# Patient Record
Sex: MALE | Race: ASIAN | NOT HISPANIC OR LATINO | URBAN - METROPOLITAN AREA
[De-identification: names, ages, dates, MRNs, and addresses within clinical notes are randomized per-mention and may not be internally consistent; named-entity substitution may affect disease eponyms.]

---

## 2020-06-16 ENCOUNTER — EMERGENCY (EMERGENCY)
Facility: HOSPITAL | Age: 25
LOS: 1 days | Discharge: HOME | End: 2020-06-16
Attending: EMERGENCY MEDICINE
Payer: MEDICAID

## 2020-06-16 VITALS
HEIGHT: 69 IN | SYSTOLIC BLOOD PRESSURE: 131 MMHG | WEIGHT: 173.06 LBS | OXYGEN SATURATION: 100 % | TEMPERATURE: 98 F | HEART RATE: 59 BPM | DIASTOLIC BLOOD PRESSURE: 78 MMHG | RESPIRATION RATE: 17 BRPM

## 2020-06-16 VITALS
SYSTOLIC BLOOD PRESSURE: 118 MMHG | TEMPERATURE: 98 F | HEART RATE: 67 BPM | OXYGEN SATURATION: 100 % | RESPIRATION RATE: 17 BRPM | DIASTOLIC BLOOD PRESSURE: 78 MMHG

## 2020-06-16 PROCEDURE — 99283 EMERGENCY DEPT VISIT LOW MDM: CPT

## 2020-06-16 RX ORDER — OXYCODONE HYDROCHLORIDE 5 MG/1
5 TABLET ORAL ONCE
Refills: 0 | Status: DISCONTINUED | OUTPATIENT
Start: 2020-06-16 | End: 2020-06-16

## 2020-06-16 RX ORDER — IBUPROFEN 200 MG
600 TABLET ORAL ONCE
Refills: 0 | Status: COMPLETED | OUTPATIENT
Start: 2020-06-16 | End: 2020-06-16

## 2020-06-16 RX ADMIN — Medication 600 MILLIGRAM(S): at 22:06

## 2020-06-16 NOTE — ED PROVIDER NOTE - NSFOLLOWUPINSTRUCTIONS_ED_ALL_ED_FT
Please call the dental clinic in the morning to be seen on an emergency basis. Take tylenol and motrin for pain. You may take 1000 mg of tylenol every 6 hours and 600 mg of ibuprofen for pain. You may take these together. Return to the emergency department if you develop fever, swelling, uncontrollable pain.

## 2020-06-16 NOTE — ED PROVIDER NOTE - PROGRESS NOTE DETAILS
Discussed follow up with Dental resident who states patient can call dental clinic tomorrow and be seen as an emergency patient for pain control but clinic is not accepting new patients at this time. Patient states his insurance does not kick in until July 1st and he has an appointment on July 6th but he was hoping to be seen because he is in pain daily. Discussed extensively with patient - advised to call clinic and ask to be seen on an emergency basis for pain control. Patient does not want narcotic medication and says his pain is not so severe at the moment. Will give ibuprofen and dental clinic info. I advised patient that he can use ibuprofen and tylenol together for pain control. Return precautions discussed - fever, swelling, severe uncontrolled pain.

## 2020-06-16 NOTE — ED ADULT NURSE NOTE - OBJECTIVE STATEMENT
24y M aaox4 ambulatory from home presents to ED c/o toothache, a s per pt 1 month ago while eating a steak broke the Rtop tooth  tooth, unable to go see a Dentist , come to Ed for evaluation, pt reports pain w/ radiation to the R side of the face, jaw and a, unable to eat and sleep for the pain. Pt denies CP, SOB, HA, vision changes, n/v/d, fevers chills, abdominal pain. Safety and comfort measures initiated- bed placed in lowest position and side rails raised. Pt oriented to call bell system.

## 2020-06-16 NOTE — ED PROVIDER NOTE - PATIENT PORTAL LINK FT
You can access the FollowMyHealth Patient Portal offered by [facility] by registering at the following website: [email or web address]. By joining [facility]’s FollowMyHealth portal, you will also be able to view your health information using other applications (apps) compatible with our system.

## 2020-06-16 NOTE — ED PROVIDER NOTE - CLINICAL SUMMARY MEDICAL DECISION MAKING FREE TEXT BOX
chipped tooth x 1 month, no signs of infection. pain control in ED with motrin (declining oxycodone) and follow up with dental clinic on emergency basis

## 2020-06-16 NOTE — ED PROVIDER NOTE - ENMT, MLM
Airway patent, Nasal mucosa clear. Mouth with normal mucosa. Throat has no vesicles, no oropharyngeal exudates and uvula is midline. Tooth #1 chipped

## 2020-06-16 NOTE — ED PROVIDER NOTE - NSFOLLOWUPCLINICS_GEN_ALL_ED_FT
[facility]  Dental  [address] [place] [id number]  Phone: [phone]  Fax:   Follow Up Time:

## 2020-06-16 NOTE — ED PROVIDER NOTE - OBJECTIVE STATEMENT
Patient is a 23 yo M with no chronic medical problems here for toothache. He states he chipped his tooth, right upper molar about 1 month ago and has been having throbbing headaches as a result. Denies fever, chills, nausea, vomiting. He made an appointment with the dental clinic but then could not make the appointment last week. No facial swelling. He has been taking tylenol with some relief and applying orajel.

## 2021-08-08 ENCOUNTER — TRANSCRIPTION ENCOUNTER (OUTPATIENT)
Age: 26
End: 2021-08-08

## 2022-07-25 PROBLEM — Z00.00 ENCOUNTER FOR PREVENTIVE HEALTH EXAMINATION: Status: ACTIVE | Noted: 2022-07-25

## 2022-07-26 ENCOUNTER — APPOINTMENT (OUTPATIENT)
Dept: ORTHOPEDIC SURGERY | Facility: CLINIC | Age: 27
End: 2022-07-26

## 2022-07-26 VITALS — HEIGHT: 68 IN | BODY MASS INDEX: 28.04 KG/M2 | WEIGHT: 185 LBS

## 2022-07-26 DIAGNOSIS — M79.674 PAIN IN RIGHT TOE(S): ICD-10-CM

## 2022-07-26 PROCEDURE — 73562 X-RAY EXAM OF KNEE 3: CPT | Mod: RT

## 2022-07-26 PROCEDURE — 73630 X-RAY EXAM OF FOOT: CPT | Mod: RT

## 2022-07-26 PROCEDURE — 99204 OFFICE O/P NEW MOD 45 MIN: CPT

## 2022-07-26 NOTE — IMAGING
[Degenerative change] : Degenerative change [Right] : right foot [There are no fractures, subluxations or dislocations. No significant abnormalities are seen] : There are no fractures, subluxations or dislocations. No significant abnormalities are seen [de-identified] : Right knee:\par +ligamentous laxity\par No effusion\par Medial joint line and medial facet of patella TTP. +patellar crepitus\par ROM 0-135\par 5/5 strength throughout\par +Mcmurrays\par +lachman 2+ \par +pain with varus stress.  5-10mm opening to varus stress\par equivocal dial test\par 4+ quadrant mobility of patella with pain and apprehension\par \par Right Foot:\par No swelling\par TTP over 1st MTP\par Full ROM\par 5/5 strength throughout

## 2022-07-26 NOTE — HISTORY OF PRESENT ILLNESS
[6] : 6 [1] : 2 [Dull/Aching] : dull/aching [de-identified] : 7/26/22: Patient is a 25 yo male c/o right big toe and right knee pain for 3 months with no specific injury. Hx of right knee injury 12 years ago, sprain, improved with PT. No n/t. States his knee feels "unstable." Not taking any medication for pain. Knee pain is worse with walking and activity. Toe pain is worse after getting up from sitting for long period time. No previous surgeries to right knee or right foot.\par Occupation: Restaurant work \par \par  [FreeTextEntry5] : pain started 5/2022, worse up and down stairs associated with cracking, clicking, weakness, buckling pain located medial, lateral

## 2022-07-26 NOTE — DISCUSSION/SUMMARY
[de-identified] : discussed likely multilgamentous knee injury. recommend MRI right knee eval acl, lcl, pcl, menisci. dex curran mri\par \par ------------------------------------------------------------------------------------------------------------------------------------------------------\par \par \par The patient was advised of the diagnosis.  The natural history of the pathology was explained in full. All questions were answered.  The risks and benefits of conservative and interventional treatment alternatives were explained to the patient\par \par ------------------------------------------------------------------------------------------------------------------------------------------------------\par \par The patient was advised that an advanced imaging study (MRI/CT/etc) will be ordered to evaluate potential pathology in the affected area(s).  The patient was further advised to follow up in the office to review the results of the study and determine further management that may be indicated.\par \par ------------------------------------------------------------------------------------------------------------------------------------------------------\par \par

## 2022-07-31 ENCOUNTER — FORM ENCOUNTER (OUTPATIENT)
Age: 27
End: 2022-07-31

## 2022-08-01 ENCOUNTER — APPOINTMENT (OUTPATIENT)
Dept: MRI IMAGING | Facility: CLINIC | Age: 27
End: 2022-08-01

## 2022-08-01 PROCEDURE — 73721 MRI JNT OF LWR EXTRE W/O DYE: CPT | Mod: RT

## 2022-08-15 ENCOUNTER — APPOINTMENT (OUTPATIENT)
Dept: ORTHOPEDIC SURGERY | Facility: CLINIC | Age: 27
End: 2022-08-15

## 2022-08-15 VITALS — BODY MASS INDEX: 28.04 KG/M2 | WEIGHT: 185 LBS | HEIGHT: 68 IN

## 2022-08-15 DIAGNOSIS — M23.91 UNSPECIFIED INTERNAL DERANGEMENT OF RIGHT KNEE: ICD-10-CM

## 2022-08-15 DIAGNOSIS — S89.81XA OTHER SPECIFIED INJURIES OF RIGHT LOWER LEG, INITIAL ENCOUNTER: ICD-10-CM

## 2022-08-15 DIAGNOSIS — M23.306 OTHER MENISCUS DERANGEMENTS, UNSPECIFIED MENISCUS, RIGHT KNEE: ICD-10-CM

## 2022-08-15 PROCEDURE — 99215 OFFICE O/P EST HI 40 MIN: CPT

## 2022-08-15 NOTE — DATA REVIEWED
[MRI] : MRI [Right] : of the right [Knee] : knee [I independently reviewed and interpreted images and report] : I independently reviewed and interpreted images and report [FreeTextEntry1] : complete acl tear. effusion. lfc cartilage injury posterior wb surface. mfc cartilage injury wb. buckethandle medial meniscus tear. lateral meniscus tear

## 2022-08-15 NOTE — IMAGING
[Degenerative change] : Degenerative change [Right] : right foot [There are no fractures, subluxations or dislocations. No significant abnormalities are seen] : There are no fractures, subluxations or dislocations. No significant abnormalities are seen [de-identified] : Right knee:\par +ligamentous laxity\par No effusion\par Medial joint line and medial facet of patella TTP. +patellar crepitus\par ROM 0-135\par 5/5 strength throughout\par +Mcmurrays\par +lachman 2+ \par +pain with varus stress.  5-10mm opening to varus stress\par equivocal dial test\par 4+ quadrant mobility of patella with pain and apprehension\par \par Right Foot:\par No swelling\par TTP over 1st MTP\par Full ROM\par 5/5 strength throughout

## 2022-08-15 NOTE — HISTORY OF PRESENT ILLNESS
[6] : 6 [1] : 2 [Dull/Aching] : dull/aching [de-identified] : here today to review mri\par \par \par 7/26/22: Patient is a 25 yo male c/o right big toe and right knee pain for 3 months with no specific injury. Hx of right knee injury 12 years ago, sprain, improved with PT. No n/t. States his knee feels "unstable." Not taking any medication for pain. Knee pain is worse with walking and activity. Toe pain is worse after getting up from sitting for long period time. No previous surgeries to right knee or right foot.\par \par Occupation: Restaurant work \par \par  [FreeTextEntry5] : pain started 5/2022, worse up and down stairs associated with cracking, clicking, weakness, buckling pain located medial, lateral

## 2022-08-15 NOTE — DISCUSSION/SUMMARY
[de-identified] : discussed mri findings. rx functional acl brace.  also reviewed old mri from 2010 at [facility]- complete ACL tear, buckethandle medial meniscus tear. \par \par discussed given chronic buckethandle medial mensicus tear, complete acl tear, signfiicant cartilage injuries- would recommend eval for ACL reconstruction, meniscus allograft transplantation, possible oats. consider diagnostic arthroscopy first. will need alignment films. discussed RBA/rehab/recovery related to multiple surgical options. refer to tertiary care\par \par ------------------------------------------------------------------------------------------------------------------------------------------------------\par \par The patient was advised of the diagnosis.  The natural history of the pathology was explained in full. All questions were answered.  The risks and benefits of conservative and interventional treatment alternatives were explained to the patient\par \par ------------------------------------------------------------------------------------------------------------------------------------------------------\par \par The patient was advised that an advanced imaging study (MRI/CT/etc) will be ordered to evaluate potential pathology in the affected area(s).  The patient was further advised to follow up in the office to review the results of the study and determine further management that may be indicated.\par \par ------------------------------------------------------------------------------------------------------------------------------------------------------\par \par

## 2022-08-30 ENCOUNTER — APPOINTMENT (OUTPATIENT)
Dept: ORTHOPEDIC SURGERY | Facility: CLINIC | Age: 27
End: 2022-08-30

## 2022-08-30 VITALS — BODY MASS INDEX: 27.89 KG/M2 | HEIGHT: 68 IN | WEIGHT: 184 LBS

## 2022-08-30 PROCEDURE — 99203 OFFICE O/P NEW LOW 30 MIN: CPT

## 2022-09-06 NOTE — ADDENDUM
[FreeTextEntry1] : This note was written by Cassie Mai on 08/30/2022 acting solely as a scribe for Dr. Moshe Chicas.\par \par All medical record entries made by the Scribe were at my, Dr. Moshe Chicas, direction and personally dictated by me on 08/30/2022. I have personally reviewed the chart and agree that the record accurately reflects my personal performance of the history, physical exam, assessment and plan.

## 2022-09-06 NOTE — HISTORY OF PRESENT ILLNESS
[de-identified] : 27 year old male presents today with right knee pain x 3 months. He reports a prior injury to the right knee approximately 12 years prior.  Symptoms improved at that time with some PT. He noticed recent pain after playing volleyball. He obtained x-ray and MRI through PMD. He was evaluated at [facility] with ACL and meniscus tear. He was told that he needs recommend ACL reconstruction, meniscus allograft transplantation, possible oats and was referred here for surgical consultation. The pain is intermittent and is not taking pain medication. C/O instability. \par \par The patient's past medical history, past surgical history, medications and allergies were reviewed by me today with the patient and documented accordingly. In addition, the patient's family and social history, which were noncontributory to this visit, were reviewed also.

## 2022-09-06 NOTE — DISCUSSION/SUMMARY
[de-identified] : 28 y/o male with right knee ACL tear, bucket MMT, chondral defect lateral femoral condyle\par \par Patient presents for evaluation of acute on chronic right knee pain. We discussed that he likely tore his ACL during his initial injury 12 years prior, and has been managing without an ACL since that time.  Patient currently has positive gross instability testing as well as now pathology through the medial compartment of the knee.  MRI from his initial injury showed ACL injury as well as a tear of the medial meniscus.  The medial meniscus is now displaced within the intercondylar notch consistent with a bucket-handle tear.\par \par We discussed the complexity of the patient's case at this time.  Discussion was had regarding possible surgical intervention for ACL reconstruction and meniscus surgery.  Concerned that the medial meniscus will be nonsalvageable, which may require him to ultimately need meniscus allograft transplantation.  We also discussed his varus alignment as seen on x-ray imaging today and potential need for additional osteotomy to protect the medial compartment for further breakdown as there does appear to be slight early arthrosis on x-ray imaging.  We discussed that surgical intervention may be best performed in a staged fashion to better evaluate the chondral surfaces and to determine whether the meniscus is salvageable.\par \par Consideration at this time would be for diagnostic arthroscopy of the knee with partial meniscectomy and subsequent ACL/meniscus/HTO, versus ACL reconstruction if the meniscus is salvageable medially.\par \par All risks, benefits and alternatives to the proposed surgical procedure, as well as the need for formal post-operative rehabilitation were discussed in great detail with the patient. Risks include but are not limited to pain, bleeding, infection, neurovascular injury, stiffness, revision surgery, future surgery, failure, instability, medical complications (including DVT, PE, MI), and risks of anesthesia. \par \par A discussion was also had with the patient regarding additional precautions during surgery given the recent Covid-19 pandemic; specifically with regards to perioperative care, visitor policy, and pre-admission testing. The patient understands that current protocol requires either documented Covid-19 vaccination or a negative Covid-19 test no more than 48hrs prior to surgery. \par \par Patient questions and concerns were answered.  We will follow-up with the patient next week to consider treatment alternatives.\par

## 2022-09-06 NOTE — PHYSICAL EXAM
[de-identified] : Oriented to time, place, person\par Mood: Normal\par Affect: Normal\par Appearance: Healthy, well appearing, no acute distress.\par Gait: Normal\par Assistive Devices: None\par \par Right Knee Exam:\par \par Skin: Clean, dry, intact\par Inspection: No obvious malalignment, no masses, no swelling, no effusion\par Pulses: 2+ DP/PT pulses \par ROM: 0-135 degrees of flexion. No pain with deep knee flexion/extension.\par Tenderness: +MJLT. No LJLT. No pain over the patella facets. No pain to the quadriceps tendon. No pain to the patella tendon. No posterior knee tenderness.\par Stability: Stable to varus, valgus. IIB Lachman testing. + anterior drawer, negative posterior drawer.\par Strength: 5/5 Q/H/TA/GS/EHL, without atrophy\par Neuro: Intact to light touch throughout, DTRs normal\par Additional Tests: +Danii's test, Negative patellar grind test  [de-identified] : Images were reviewed dated 7.25.2022 \par \par 4 views of the right knee that show no acute fracture or dislocation. There is mild medial, no lateral and trace patellofemoral degenerative changes seen. There is no significant malalignment. No significant other obvious osseous abnormality, otherwise unremarkable.\par \par MRI right knee dated 8.1.22 shows chronic ACL tear. Bucket handle tear of the medial meniscus. Lateral meniscus tear/chondral defect. Patella jackson.

## 2022-09-12 ENCOUNTER — OUTPATIENT (OUTPATIENT)
Dept: OUTPATIENT SERVICES | Facility: HOSPITAL | Age: 27
LOS: 1 days | End: 2022-09-12

## 2022-09-12 VITALS
TEMPERATURE: 98 F | OXYGEN SATURATION: 99 % | SYSTOLIC BLOOD PRESSURE: 114 MMHG | RESPIRATION RATE: 16 BRPM | HEIGHT: 68 IN | DIASTOLIC BLOOD PRESSURE: 78 MMHG | HEART RATE: 62 BPM | WEIGHT: 197.98 LBS

## 2022-09-12 DIAGNOSIS — S83.511A SPRAIN OF ANTERIOR CRUCIATE LIGAMENT OF RIGHT KNEE, INITIAL ENCOUNTER: ICD-10-CM

## 2022-09-12 DIAGNOSIS — S83.241A OTHER TEAR OF MEDIAL MENISCUS, CURRENT INJURY, RIGHT KNEE, INITIAL ENCOUNTER: ICD-10-CM

## 2022-09-12 LAB
HCT VFR BLD CALC: 46.3 % — SIGNIFICANT CHANGE UP (ref 39–50)
HGB BLD-MCNC: 15 G/DL — SIGNIFICANT CHANGE UP (ref 13–17)
MCHC RBC-ENTMCNC: 26.9 PG — LOW (ref 27–34)
MCHC RBC-ENTMCNC: 32.4 GM/DL — SIGNIFICANT CHANGE UP (ref 32–36)
MCV RBC AUTO: 83 FL — SIGNIFICANT CHANGE UP (ref 80–100)
NRBC # BLD: 0 /100 WBCS — SIGNIFICANT CHANGE UP (ref 0–0)
NRBC # FLD: 0 K/UL — SIGNIFICANT CHANGE UP (ref 0–0)
PLATELET # BLD AUTO: 183 K/UL — SIGNIFICANT CHANGE UP (ref 150–400)
RBC # BLD: 5.58 M/UL — SIGNIFICANT CHANGE UP (ref 4.2–5.8)
RBC # FLD: 12.4 % — SIGNIFICANT CHANGE UP (ref 10.3–14.5)
WBC # BLD: 6.07 K/UL — SIGNIFICANT CHANGE UP (ref 3.8–10.5)
WBC # FLD AUTO: 6.07 K/UL — SIGNIFICANT CHANGE UP (ref 3.8–10.5)

## 2022-09-12 NOTE — H&P PST ADULT - MUSCULOSKELETAL COMMENTS
Pt c/o of right knee pain since last few months. Pt says he had a sports injury 12 years ago , symptoms improved and lately reports pain again after  playing volleyball recently . MRI showed  right knee dated 8.1.22 shows chronic ACL tear. Bucket handle tear of the medial meniscus. Lateral meniscus tear/chondral defect. Patella jackson. Pre op dx of sprain anterior cruciate ligament right knee initial encounter

## 2022-09-12 NOTE — H&P PST ADULT - PROBLEM SELECTOR PLAN 1
Patient tentatively scheduled for right arthroscopic medial meniscus repair anterior cruciate ligament reconstruction bone tendon bone autograft on 09/27/2022.    Pre-op instructions provided. Pt given verbal and written instructions with teach back on chlorhexidine wash and pepcid. Pt verbalized understanding with return demonstration.     Pt strongly advised  for  COVID testing requirements to be done  3- 5 days prior to procedure. Pt was provided with information for covid testing locations in written form.

## 2022-09-12 NOTE — H&P PST ADULT - HISTORY OF PRESENT ILLNESS
27 year old male with pre op dx of sprain anterior cruciate ligament right knee initial encounter is scheduled for right arthroscopic medial meniscus repair anterior cruciate ligament reconstruction bone tendon bone autograft.

## 2022-09-26 ENCOUNTER — TRANSCRIPTION ENCOUNTER (OUTPATIENT)
Age: 27
End: 2022-09-26

## 2022-09-26 VITALS
SYSTOLIC BLOOD PRESSURE: 121 MMHG | WEIGHT: 197.98 LBS | TEMPERATURE: 98 F | RESPIRATION RATE: 16 BRPM | HEIGHT: 68 IN | HEART RATE: 67 BPM | OXYGEN SATURATION: 100 % | DIASTOLIC BLOOD PRESSURE: 77 MMHG

## 2022-09-27 ENCOUNTER — TRANSCRIPTION ENCOUNTER (OUTPATIENT)
Age: 27
End: 2022-09-27

## 2022-09-27 ENCOUNTER — APPOINTMENT (OUTPATIENT)
Dept: ORTHOPEDIC SURGERY | Facility: AMBULATORY SURGERY CENTER | Age: 27
End: 2022-09-27

## 2022-09-27 ENCOUNTER — OUTPATIENT (OUTPATIENT)
Dept: OUTPATIENT SERVICES | Facility: HOSPITAL | Age: 27
LOS: 1 days | Discharge: HOME | End: 2022-09-27

## 2022-09-27 VITALS
SYSTOLIC BLOOD PRESSURE: 140 MMHG | OXYGEN SATURATION: 98 % | DIASTOLIC BLOOD PRESSURE: 74 MMHG | HEART RATE: 87 BPM | TEMPERATURE: 97 F | RESPIRATION RATE: 14 BRPM

## 2022-09-27 DIAGNOSIS — S83.241A OTHER TEAR OF MEDIAL MENISCUS, CURRENT INJURY, RIGHT KNEE, INITIAL ENCOUNTER: ICD-10-CM

## 2022-09-27 PROCEDURE — 29883 ARTHRS KNE SRG MNISC RPR M&L: CPT | Mod: RT

## 2022-09-27 PROCEDURE — 29888 ARTHRS AID ACL RPR/AGMNTJ: CPT | Mod: RT

## 2022-09-27 DEVICE — PK ACCESS CRUC RAP-PAC 1.5 STRL: Type: IMPLANTABLE DEVICE | Status: FUNCTIONAL

## 2022-09-27 DEVICE — FLEXIBLE PASSING PIN 13.50X2.4MM: Type: IMPLANTABLE DEVICE | Status: FUNCTIONAL

## 2022-09-27 DEVICE — IMPLANTABLE DEVICE: Type: IMPLANTABLE DEVICE | Status: FUNCTIONAL

## 2022-09-27 DEVICE — S&N FASTFIX 360 CURVED: Type: IMPLANTABLE DEVICE | Status: FUNCTIONAL

## 2022-09-27 DEVICE — ARTHREX SECONDARY FIXATION WITH PEEK SWIVELOCK ANCHOR 4.75 X 19.1MM: Type: IMPLANTABLE DEVICE | Status: FUNCTIONAL

## 2022-09-27 RX ORDER — FAMOTIDINE 10 MG/ML
0 INJECTION INTRAVENOUS
Qty: 0 | Refills: 0 | DISCHARGE

## 2022-09-27 RX ORDER — ASPIRIN 325 MG/1
325 TABLET, FILM COATED ORAL DAILY
Qty: 28 | Refills: 0 | Status: ACTIVE | COMMUNITY
Start: 2022-09-27 | End: 1900-01-01

## 2022-09-27 NOTE — ASU DISCHARGE PLAN (ADULT/PEDIATRIC) - CARE PROVIDER_API CALL
Moshe Chicas)  Orthopedics  [address]  [place] [address]  Phone: [phone]  Fax: [phone]  Follow Up Time: 2 weeks

## 2022-09-27 NOTE — ASU PREOP CHECKLIST - NS PREOP CHK TEST_COVID RESULT_GEN_ALL_CORE
CHF Week 1 Survey      Responses   Facility patient discharged from?  [facility]   Does the patient have one of the following disease processes/diagnoses(primary or secondary)?  CHF   Is there a successful TCM telephone encounter documented?  No   CHF Week 1 attempt successful?  No   Rescheduled  Revoked          Alessandra Allen RN   Negative

## 2022-09-27 NOTE — ASU PREOP CHECKLIST - TEMPERATURE IN FAHRENHEIT (DEGREES F)
DISCHARGE INSTRUCTIONS  [facility]  [address] [place] [address]   [phone]      Name: Luiz Beasley    Date of Treatment: 3/23/17    Doctor: Dr. Viramontes    Your nurse is: Azeb PENALOZAN , RN    Chemotherapy drugs administered today:  Taxotere, Carboplatin, Herceptin, Perjeta    Home Medications:  MEDICATION: INSTRUCTIONS:        Dexamethasone 4mg tablets Take as directed - FOR NEXT TREATMENT   2 tabs am  2 tabs pm on   04/12/17   2 tabs am  2 tabs pm on   4/13/17 2 tabs am  2 tabs pm   on   4/14/17   Prochlorperazine  10 mg Take 1 tab every 6 hours if needed for nausea.        Emla cream 2.5%  apply to port 30-60 minutes prior to treatment.            Neulasta( kit )  6 mg       Remove kit on 3/24/17 around  6 pm        Claritin 10 mg      take once a day for 3 days starting tomorrow 3/24/17        Lorazepam 0.5mg      Take one tablet at bedtime as needed to help sleep.              1. Increase fluid intake to 6-8 (8oz) glasses of fluids daily.    2. Use Biotene mouthwash daily to help prevent mouth sores.    3. Refer to \"When to call\" sheet!    4. Good hand washing.    5.  Use Senokot-S or Miralax for constipation.  Use Imodium-AD for diarrhea.     6  Return next Tuesday for a blood test and hydration check       Call our 24 hr number with any problems.  Mon-Fri 9am-5pm ask for Azeb   After hours or weekends, ask for Dr. Viramontes or the doctor on call. [phone]      97.5

## 2022-09-27 NOTE — ASU DISCHARGE PLAN (ADULT/PEDIATRIC) - NURSING INSTRUCTIONS
You received IV Tylenol for pain management at 2:45pm. Please DO NOT take any Tylenol (Acetaminophen) containing products, such as Vicodin, Percocet, Excedrin, and cold medications for the next 6 hours (until 8:45PM). DO NOT TAKE MORE THAN 3000 MG OF TYLENOL in a 24 hour period.

## 2022-09-28 LAB — SARS-COV-2 N GENE NPH QL NAA+PROBE: NOT DETECTED

## 2022-09-28 RX ORDER — DOCUSATE SODIUM 100 MG
100 TABLET ORAL 3 TIMES DAILY
Qty: 90 | Refills: 0 | Status: ACTIVE | COMMUNITY
Start: 2022-09-28 | End: 1900-01-01

## 2022-09-28 RX ORDER — OXYCODONE AND ACETAMINOPHEN 5; 325 MG/1; MG/1
5-325 TABLET ORAL
Qty: 30 | Refills: 0 | Status: ACTIVE | COMMUNITY
Start: 2022-09-27 | End: 1900-01-01

## 2022-10-10 ENCOUNTER — APPOINTMENT (OUTPATIENT)
Dept: ORTHOPEDIC SURGERY | Facility: CLINIC | Age: 27
End: 2022-10-10

## 2022-10-10 VITALS
HEART RATE: 84 BPM | SYSTOLIC BLOOD PRESSURE: 117 MMHG | WEIGHT: 185 LBS | BODY MASS INDEX: 28.04 KG/M2 | DIASTOLIC BLOOD PRESSURE: 81 MMHG | HEIGHT: 68 IN

## 2022-10-10 PROBLEM — S83.511A SPRAIN OF ANTERIOR CRUCIATE LIGAMENT OF RIGHT KNEE, INITIAL ENCOUNTER: Chronic | Status: ACTIVE | Noted: 2022-09-12

## 2022-10-10 PROCEDURE — 73562 X-RAY EXAM OF KNEE 3: CPT | Mod: RT

## 2022-10-10 PROCEDURE — 99024 POSTOP FOLLOW-UP VISIT: CPT

## 2022-10-10 NOTE — ADDENDUM
[FreeTextEntry1] : This note was written by Cassie Mai on 10/10/2022 acting solely as a scribe for Dr. Moshe Chicas.\par \par All medical record entries made by the Scribe were at my, Dr. Moshe Chicas, direction and personally dictated by me on 10/10/2022. I have personally reviewed the chart and agree that the record accurately reflects my personal performance of the history, physical exam, assessment and plan.

## 2022-10-10 NOTE — HISTORY OF PRESENT ILLNESS
[4] : the patient reports pain that is 4/10 in severity [Clean/Dry/Intact] : clean, dry and intact [Healed] : healed [Neuro Intact] : an unremarkable neurological exam [Vascular Intact] : ~T peripheral vascular exam normal [Doing Well] : is doing well [Excellent Pain Control] : has excellent pain control [No Sign of Infection] : is showing no signs of infection [Sutures Removed] : sutures were removed [Steri-Strips Removed & Replaced] : steri-strips removed and replaced [Chills] : no chills [Fever] : no fever [Nausea] : no nausea [Vomiting] : no vomiting [Erythema] : not erythematous [Discharge] : absent of discharge [Swelling] : not swollen [Dehiscence] : not dehisced [de-identified] : 28 y/o male s/p right ACL reconstruction (BTB auto), MMR, LMR 9/27/22 [de-identified] : 28 y/o male s/p right ACL reconstruction (BTB auto), MMR, LMR. Patient is doing well postoperatively. He has been compliant with his [place]. He complains of numbness over his calf and shin. He no longer requires the use of pain medication. He has not started PT. [de-identified] : Right Knee Exam:\par \par Skin: Incision(s) Clean, dry, intact, no drainage, healed\par Inspection: Residual swelling, moderate residual effusion, ecchymosis\par Pulses: 2+ DP/PT pulses \par ROM: Not tested [left/right]\par Tenderness: Tender throughout anterior knee joint.\par Stability: Stable\par Strength: Intact Q/H/TA/GS/EHL\par Neuro: Intact to light touch throughout  [de-identified] : The following radiographs were ordered and read by me during this patients visit. I reviewed each radiograph in detail with the patient and discussed the findings as highlighted below.\par \par  2 views of the right knee were obtained today, 10/10/2022, that show interval change of an anatomic anterior cruciate ligament reconstruction. No evidence of hardware failure, otherwise unremarkable.  [de-identified] : 28 y/o male s/p right ACL reconstruction (BTB auto), MMR, LMR\par \par All intraoperative imaging was discussed in detail with the patient. All questions were answered. \par \par Recommendations: \par 1. PT evaluation and treatment; including AAROM/AROM, therapeutic exercise (include hamstring and quadriceps strengthening), heel slides, nonweightbearing stretch of the gastrocsoleus complex and straight leg raises to prevent extension lag. Progression to closed chain exercises/balance exercise/bike in 2-4 weeks.\par 2. Brace: Unlock brace for ambulation as tolerated. Remove the brace for sleeping as tolerated. May discontinue brace once full extension and without extensor lag has been achieved (approx 4-6wks post-op)\par 3. Meds: Continue [id number] daily, pain medication prn, may transition to NSAIDS.\par 4. Ice/elevate as needed and\par 5. Restrictions: None \par \par Followup in 4 weeks for imaging and clinical evaluation of stability.

## 2022-11-07 ENCOUNTER — APPOINTMENT (OUTPATIENT)
Dept: ORTHOPEDIC SURGERY | Facility: CLINIC | Age: 27
End: 2022-11-07

## 2022-11-07 PROCEDURE — 73562 X-RAY EXAM OF KNEE 3: CPT | Mod: RT

## 2022-11-07 PROCEDURE — 99024 POSTOP FOLLOW-UP VISIT: CPT

## 2022-11-10 NOTE — HISTORY OF PRESENT ILLNESS
[4] : the patient reports pain that is 4/10 in severity [Clean/Dry/Intact] : clean, dry and intact [Healed] : healed [Neuro Intact] : an unremarkable neurological exam [Vascular Intact] : ~T peripheral vascular exam normal [Doing Well] : is doing well [Excellent Pain Control] : has excellent pain control [No Sign of Infection] : is showing no signs of infection [Chills] : no chills [Fever] : no fever [Nausea] : no nausea [Vomiting] : no vomiting [Erythema] : not erythematous [Discharge] : absent of discharge [Swelling] : not swollen [Dehiscence] : not dehisced [de-identified] : 28 y/o male s/p right ACL reconstruction (BTB auto), MMR, LMR 9/27/22 [de-identified] : 28 y/o male s/p right ACL reconstruction (BTB auto), MMR, LMR. Patient is doing well postoperatively. Attending PT 2 x per week.  He has been compliant with his Adrianne and ambulating via one crutch. He is not taking pain medication.  [de-identified] : Right Knee Exam:\par \par Skin: Incision(s) Clean, dry, intact, no drainage, healed\par Inspection: Min residual effusion\par Pulses: 2+ DP/PT pulses \par ROM: 0-70 knee flexion. \par Tenderness: Tender throughout anterior knee joint.\par Stability: Stable, IA lachman\par Strength: Intact Q/H/TA/GS/EHL\par Neuro: Intact to light touch throughout  [de-identified] : The following radiographs were ordered and read by me during this patients visit. I reviewed each radiograph in detail with the patient and discussed the findings as highlighted below.\par \par  2 views of the right knee were obtained today, 11/07/2022, that show interval change of an anatomic anterior cruciate ligament reconstruction. No evidence of hardware failure, otherwise unremarkable.  [de-identified] : 28 y/o male s/p right ACL reconstruction (BTB auto), MMR, LMR\par \par Postoperative imaging shows anatomic ACL reconstruction. Patient has good clinical stability on examination, and is doing well with postoperative rehabilitation at this time.  Patient has some residual postoperative stiffness, and I discussed aggressive range of motion at this time with unlocking his Adrianne brace.\par \par Recommendations: \par 1. Continue PT per protocol; WBAT, closed chain exercises, proprioception exercise, begin elliptical when tolerated, hamstring stretching/strengthening\par 2. Brace: May discontinue brace once full extension and without extensor lag has been achieved\par 3. Meds: Discontinue  mg daily, OTC pain/Nsaids medication prn\par 4. Continue symptomatic Ice/elevate as needed\par 5. Restrictions: as discussed\par \par Followup in 6 weeks.

## 2022-11-10 NOTE — ADDENDUM
[FreeTextEntry1] : This note was written by Cassie Mai on 11/07/2022 acting solely as a scribe for Dr. Moshe Chicas.\par \par All medical record entries made by the Scribe were at my, Dr. Moshe Chicas, direction and personally dictated by me on 11/07/2022. I have personally reviewed the chart and agree that the record accurately reflects my personal performance of the history, physical exam, assessment and plan.

## 2022-12-12 ENCOUNTER — APPOINTMENT (OUTPATIENT)
Dept: ORTHOPEDIC SURGERY | Facility: CLINIC | Age: 27
End: 2022-12-12

## 2022-12-12 PROCEDURE — 99024 POSTOP FOLLOW-UP VISIT: CPT

## 2022-12-14 NOTE — HISTORY OF PRESENT ILLNESS
[0] : no pain reported [Clean/Dry/Intact] : clean, dry and intact [Healed] : healed [Neuro Intact] : an unremarkable neurological exam [Vascular Intact] : ~T peripheral vascular exam normal [Doing Well] : is doing well [Excellent Pain Control] : has excellent pain control [No Sign of Infection] : is showing no signs of infection [Chills] : no chills [Fever] : no fever [Nausea] : no nausea [Vomiting] : no vomiting [Erythema] : not erythematous [Discharge] : absent of discharge [Swelling] : not swollen [Dehiscence] : not dehisced [de-identified] : 26 y/o male s/p right ACL reconstruction (BTB auto), MMR, LMR 9/27/22.  [de-identified] : 28 y/o male presnets today s/p s/p right ACL reconstruction (BTB auto), MMR, LMR. He is attending PT 2 x per week and is progressing well. He is not taking pain medication. Denies post op complications.  [de-identified] : Right Knee Exam:\par \par Skin: Incision(s) Clean, dry, intact, no drainage, healed\par Inspection: Min residual effusion\par Pulses: 2+ DP/PT pulses \par ROM:  knee flexion. \par Tenderness: Tender throughout anterior knee joint.\par Stability: Stable, IA lachman\par Strength: Intact Q/H/TA/GS/EHL\par Neuro: Intact to light touch throughout.  [de-identified] : 26 y/o male presnets today s/p s/p right ACL reconstruction (BTB auto), MMR, LMR.\par \par Patient has good clinical stability on examination, and is doing well with postoperative rehabilitation at this time. There is residual stiffness within the knee. We discussed continued aggressive ROM with PT prior to considering DARIA. If patient does not improve or continues to have some residual stiffness at the next visit, DARIA may be appropriate. \par \par Recommendations: \par 1. Continue PT per protocol; WBAT, closed chain exercises, proprioception exercise, begin elliptical when tolerated, hamstring stretching/strengthening\par 2. Brace: May discontinue brace once full extension and without extensor lag has been achieved\par 3. Meds: Discontinue  mg daily, OTC pain/Nsaids medication prn\par 4. Continue symptomatic Ice/elevate as needed\par \par Followup in 4 weeks.

## 2022-12-14 NOTE — ADDENDUM
[FreeTextEntry1] : This note was written by Cassie Mai on 12/12/2022 acting solely as a scribe for Dr. Moshe Chicas.\par \par All medical record entries made by the Scribe were at my, Dr. Moshe Chicas, direction and personally dictated by me on 12/12/2022. I have personally reviewed the chart and agree that the record accurately reflects my personal performance of the history, physical exam, assessment and plan.

## 2023-01-09 ENCOUNTER — APPOINTMENT (OUTPATIENT)
Dept: ORTHOPEDIC SURGERY | Facility: CLINIC | Age: 28
End: 2023-01-09
Payer: MEDICAID

## 2023-01-09 VITALS
DIASTOLIC BLOOD PRESSURE: 83 MMHG | TEMPERATURE: 97.2 F | HEART RATE: 65 BPM | HEIGHT: 68 IN | BODY MASS INDEX: 28.04 KG/M2 | OXYGEN SATURATION: 99 % | WEIGHT: 185 LBS | SYSTOLIC BLOOD PRESSURE: 124 MMHG

## 2023-01-09 PROCEDURE — 99213 OFFICE O/P EST LOW 20 MIN: CPT

## 2023-01-09 RX ORDER — DICLOFENAC SODIUM 75 MG/1
75 TABLET, DELAYED RELEASE ORAL TWICE DAILY
Qty: 30 | Refills: 0 | Status: ACTIVE | COMMUNITY
Start: 2023-01-09 | End: 1900-01-01

## 2023-01-10 NOTE — PHYSICAL EXAM
[de-identified] : Oriented to time, place, person\par Mood: Normal\par Affect: Normal\par Appearance: Healthy, well appearing, no acute distress.\par Gait: Normal\par Assistive Devices: None \par \par Right Knee Exam:\par \par Skin: Incision(s) Clean, dry, intact, no drainage, healed\par Inspection: Min residual effusion\par Pulses: 2+ DP/PT pulses \par ROM: 5-120 knee flexion. \par Tenderness: minimally tender throughout anterior knee joint.\par Stability: Stable, IA lachman\par Strength: Intact Q/H/TA/GS/EHL\par Neuro: Intact to light touch throughout.

## 2023-01-10 NOTE — DISCUSSION/SUMMARY
[de-identified] : 28 y/o male presents today s/p s/p right ACL reconstruction (BTB auto), MMR, LMR.\par \par There was concern previously that the patient had a loss of terminal motion both in flexion and extension.  The patient has improved range of motion at this time.  Still with some terminal loss of extension likely due to meniscus repair protocol, but at this time I do not believe that manipulation is warranted.  We will continue to follow closely.  Significant improvement.\par \par Recommendations: \par 1. Continue PT per protocol; WBAT, closed chain exercises, proprioception exercise, begin elliptical when tolerated, hamstring stretching/strengthening\par 2. NSAIDS prn\par 3. Continue symptomatic Ice/elevate as needed\par \par Followup in 2 months. \par  \par

## 2023-01-10 NOTE — HISTORY OF PRESENT ILLNESS
[de-identified] : 26 y/o male presnets today s/p s/p right ACL reconstruction (BTB auto), MMR, LMR 9.27.2022. He is attending PT 2 x per week and is progressing well. C/o slight swelling past few days but it seems to have resolved today.  He is not taking pain medication. Denies post op complications. no pain reported. Current symptoms include no chills, no fever, no nausea and no vomiting.

## 2023-03-06 ENCOUNTER — APPOINTMENT (OUTPATIENT)
Dept: ORTHOPEDIC SURGERY | Facility: CLINIC | Age: 28
End: 2023-03-06
Payer: MEDICAID

## 2023-03-06 DIAGNOSIS — S83.211D BUCKET-HANDLE TEAR OF MEDIAL MENISCUS, CURRENT INJURY, RIGHT KNEE, SUBSEQUENT ENCOUNTER: ICD-10-CM

## 2023-03-06 DIAGNOSIS — S83.271D COMPLEX TEAR OF LATERAL MENISCUS, CURRENT INJURY, RIGHT KNEE, SUBSEQUENT ENCOUNTER: ICD-10-CM

## 2023-03-06 PROCEDURE — 73562 X-RAY EXAM OF KNEE 3: CPT | Mod: RT

## 2023-03-06 PROCEDURE — 99213 OFFICE O/P EST LOW 20 MIN: CPT

## 2023-03-07 PROBLEM — S83.211D BUCKET-HANDLE TEAR OF MEDIAL MENISCUS OF RIGHT KNEE AS CURRENT INJURY, SUBSEQUENT ENCOUNTER: Status: ACTIVE | Noted: 2022-09-06

## 2023-03-07 PROBLEM — S83.271D COMPLEX TEAR OF LATERAL MENISCUS, CURRENT INJURY, RIGHT KNEE, SUBSEQUENT ENCOUNTER: Status: ACTIVE | Noted: 2022-10-10

## 2023-03-07 NOTE — DISCUSSION/SUMMARY
[de-identified] : 28 y/o male presents today s/p right ACL reconstruction (BTB auto), MMR, LMR.\par \par Patient continues to have very mild loss of motion in flexion and extension.  However, patient's symptoms are mild. We will continue to follow closely through the next 6 months of his recovery.  Otherwise well without complications at this time.\par \par Recommendations: Continue PT per protocol. NSAIDS/ice prn.  Continue to progress with terminal motion.  Light activity as discussed.\par \par Followup in 2 months. \par  \par

## 2023-03-07 NOTE — HISTORY OF PRESENT ILLNESS
[de-identified] : 26 y/o male presents today for follow-up s/p right ACL reconstruction (BTB auto), MMR, LMR 9.27.2022. He is attending PT 4 x per week and is progressing well. C/O occasional mild knee pain. He is not taking pain medication. Denies post op complications. no pain reported today.

## 2023-03-07 NOTE — ADDENDUM
[FreeTextEntry1] : This note was written by Cassie Mai on 03/06/2023 acting solely as a scribe for Dr. Moshe Chicas.\par \par All medical record entries made by the Scribe were at my, Dr. Moshe Chicas, direction and personally dictated by me on 03/06/2023. I have personally reviewed the chart and agree that the record accurately reflects my personal performance of the history, physical exam, assessment and plan.

## 2023-03-07 NOTE — PHYSICAL EXAM
[de-identified] : Oriented to time, place, person\par Mood: Normal\par Affect: Normal\par Appearance: Healthy, well appearing, no acute distress.\par Gait: Normal\par Assistive Devices: None \par \par Right Knee Exam:\par \par Skin: Incision(s) Clean, dry, intact, no drainage, healed\par Inspection: Normal\par Pulses: 2+ DP/PT pulses \par ROM: 2-125 knee flexion. \par Tenderness: None\par Stability: Stable, IA lachman\par Strength: Intact Q/H/TA/GS/EHL\par Neuro: Intact to light touch throughout.  [de-identified] : \par The following radiographs were ordered and read by me during this patients visit. I reviewed each radiograph in detail with the patient and discussed the findings as highlighted below. \par \par 3 views of the right knee were obtained today that show anatomic anterior cruciate ligament reconstruction with bone tendon bone autograft. No evidence of hardware failure, otherwise unremarkable.

## 2023-06-06 ENCOUNTER — APPOINTMENT (OUTPATIENT)
Dept: ORTHOPEDIC SURGERY | Facility: CLINIC | Age: 28
End: 2023-06-06
Payer: MEDICAID

## 2023-06-06 VITALS
BODY MASS INDEX: 28.49 KG/M2 | HEIGHT: 68 IN | HEART RATE: 59 BPM | DIASTOLIC BLOOD PRESSURE: 75 MMHG | WEIGHT: 188 LBS | SYSTOLIC BLOOD PRESSURE: 112 MMHG

## 2023-06-06 DIAGNOSIS — M23.51 CHRONIC INSTABILITY OF KNEE, RIGHT KNEE: ICD-10-CM

## 2023-06-06 DIAGNOSIS — S83.512A SPRAIN OF ANTERIOR CRUCIATE LIGAMENT OF LEFT KNEE, INITIAL ENCOUNTER: ICD-10-CM

## 2023-06-06 PROCEDURE — 99213 OFFICE O/P EST LOW 20 MIN: CPT

## 2023-06-06 PROCEDURE — 73562 X-RAY EXAM OF KNEE 3: CPT | Mod: LT

## 2023-06-07 PROBLEM — S83.512A CHRONIC RUPTURE OF ACL OF LEFT KNEE: Status: ACTIVE | Noted: 2023-06-07

## 2023-06-07 PROBLEM — M23.51 OLD COMPLETE TEAR OF ANTERIOR CRUCIATE LIGAMENT OF RIGHT KNEE: Status: ACTIVE | Noted: 2022-08-15

## 2023-06-07 NOTE — HISTORY OF PRESENT ILLNESS
[de-identified] : 28 y/o male presents today for follow-up s/p right ACL reconstruction (BTB auto), MMR, LMR 9.27.2022. He has completed PT and has been exercising on his own with out issue. He is able to jog without issue. Denies post op complications. No pain reported today. Does report issues to the contralateral knee.  He reports that his left knee buckled a few weeks ago.  Does report a remote injury 6 or 7 years ago.  Knee swelled up, but has since gotten better.

## 2023-06-07 NOTE — ADDENDUM
[FreeTextEntry1] : This note was written by Cassie Mai on 06/06/2023 acting solely as a scribe for Dr. Moshe Chicas.\par \par All medical record entries made by the Scribe were at my, Dr. Moshe Chicas, direction and personally dictated by me on 06/06/2023. I have personally reviewed the chart and agree that the record accurately reflects my personal performance of the history, physical exam, assessment and plan.

## 2023-06-07 NOTE — PHYSICAL EXAM
[de-identified] : Oriented to time, place, person\par Mood: Normal\par Affect: Normal\par Appearance: Healthy, well appearing, no acute distress.\par Gait: Normal\par Assistive Devices: None \par \par Right Knee Exam:\par \par Skin: Incision(s) Clean, dry, intact, no drainage, healed\par Inspection: Normal\par Pulses: 2+ DP/PT pulses \par ROM: 0-135 knee flexion. \par Tenderness: None\par Stability: Stable, IA lachman\par Strength: Intact Q/H/TA/GS/EHL\par Neuro: Intact to light touch throughout. \par \par Left Knee Exam:\par \par Skin: Clean, dry, intact\par Inspection: No obvious malalignment, no masses, no swelling, no effusion\par Pulses: 2+ DP/PT pulses \par ROM: 0-135 degrees of flexion. No pain with deep knee flexion/extension.\par Tenderness: No MJLT. No LJLT. No pain over the patella facets. No pain to the quadriceps tendon. No pain to the patella tendon. No posterior knee tenderness.\par Stability: Stable to varus, valgus. IIB Lachman testing. + anterior drawer, negative posterior drawer.\par Strength: 5/5 Q/H/TA/GS/EHL, without atrophy\par Neuro: Intact to light touch throughout, DTRs normal\par Additional Tests: Negative Danii's test, Negative patellar grind test  [de-identified] : \par The following radiographs were ordered and read by me during this patients visit. I reviewed each radiograph in detail with the patient and discussed the findings as highlighted below. \par \par 3 views of the left knee were obtained today that show no acute fracture or dislocation. There is no degenerative change seen. There is no significant malalignment. No significant other obvious osseous abnormality, otherwise unremarkable.  Left knee injury

## 2023-06-07 NOTE — DISCUSSION/SUMMARY
[de-identified] : 26 y/o male presents today s/p right ACL reconstruction (BTB auto), MMR, LMR and left knee instability. \par \par Patient presents for follow-up of right knee ACL reconstruction.  Patient is doing well, and is looking to return to activity as he is nearing the end of his rehabilitation program.  Patient does report some mild residual weakness, but is looking to return to high-impact activity.  Recommend referral to ACL BRIDGE PROGRAM. \par \par Patient also reports recent injury to the left knee.  Patient reports no prior complaints of instability, but does report a remote history of left knee injury many years ago.  The patient's symptoms are consistent with possible anterior cruciate ligament pathology through with positive gross instability testing.  Discussed with patient apparent instability compared to the contralateral side, and recommend MRI imaging. \par \par Recommendation: Left knee MRI imaging. Refer to bridge program for the right knee. Activity as discussed.\par \par Follow up after MRI.\par \par  \par

## 2023-07-31 NOTE — BEGINNING OF VISIT
Noted,   GI: please reach out to us for cardiac clearance when rescheduled. Thank you!   [Patient] : patient

## (undated) DEVICE — GLV 8 PROTEXIS (CREAM) NEU-THERA

## (undated) DEVICE — DRSG STERISTRIPS 0.25 X 3"

## (undated) DEVICE — SUT ETHIBOND 2 30" V37

## (undated) DEVICE — SUT VICRYL 0 18" CT-1 (POP-OFF)

## (undated) DEVICE — SUT VICRYL 3-0 18" SH (POP-OFF)

## (undated) DEVICE — TOURNIQUET CUFF 34" DUAL PORT W PLC

## (undated) DEVICE — DEPUY ACL GRAFT KNIFE 9MM

## (undated) DEVICE — DEPUY ACL GRAFT KNIFE 10MM

## (undated) DEVICE — TUBING DEPUY MITEK FMS INFLOW

## (undated) DEVICE — PACK KNEE ARTHROSCOPY

## (undated) DEVICE — POSITIONER STRAP ARMBOARD VELCRO TS-30

## (undated) DEVICE — SAW BLADE MICROAIRE SAGITTAL 9.4MMX25.4MMX0.6MM

## (undated) DEVICE — DRAPE 3/4 SHEET 52X76"

## (undated) DEVICE — SHAVER BLADE S&N FULL RADIUS BONECUTTER 5.5MM (ORANGE)

## (undated) DEVICE — S&N FASTFIX 360 STRAIGHT KNOT PUSHER SET

## (undated) DEVICE — SUT FIBERWIRE LOOP 2 STRAIGHT NDL 15"

## (undated) DEVICE — Device

## (undated) DEVICE — SOL IRR BAG NS 0.9% 3000ML

## (undated) DEVICE — NDL HYPO SAFE 21G X 1.5" (GREEN)

## (undated) DEVICE — SUT NYLON 3-0 18" PS-2

## (undated) DEVICE — WARMING BLANKET UPPER ADULT

## (undated) DEVICE — TOURNIQUET ESMARK 6"

## (undated) DEVICE — TUBING DEPUY MITEK FMS OUTFLOW

## (undated) DEVICE — CANNULA S&N CLEAR-TRAC SCREW 7MM

## (undated) DEVICE — SUT MONOCRYL 4-0 18" PS-2

## (undated) DEVICE — ARTHREX KIT ACL TRANSTIBIAL WITHOUT SAW BLADE

## (undated) DEVICE — PACK MINOR NO DRAPE

## (undated) DEVICE — TOURNIQUET CUFF 24" DUAL PORT SINGLE BLADDER LUER LOCK  (BLACK)

## (undated) DEVICE — SHAVER BLADE S&N FULL RADIUS 3.5MM STRAIGHT (BEIGE)

## (undated) DEVICE — BLADE SURGICAL #15 CARBON

## (undated) DEVICE — NDL SPINAL 18G X 3.5" (PINK)

## (undated) DEVICE — SUT VICRYL 2-0 27" CT-2 UNDYED

## (undated) DEVICE — VENODYNE/SCD SLEEVE CALF MEDIUM